# Patient Record
Sex: FEMALE | Race: NATIVE HAWAIIAN OR OTHER PACIFIC ISLANDER | NOT HISPANIC OR LATINO
[De-identification: names, ages, dates, MRNs, and addresses within clinical notes are randomized per-mention and may not be internally consistent; named-entity substitution may affect disease eponyms.]

---

## 2019-09-09 ENCOUNTER — APPOINTMENT (OUTPATIENT)
Dept: GASTROENTEROLOGY | Facility: CLINIC | Age: 84
End: 2019-09-09
Payer: MEDICARE

## 2019-09-09 VITALS
HEIGHT: 58 IN | SYSTOLIC BLOOD PRESSURE: 126 MMHG | BODY MASS INDEX: 17.42 KG/M2 | WEIGHT: 83 LBS | HEART RATE: 59 BPM | TEMPERATURE: 97.4 F | DIASTOLIC BLOOD PRESSURE: 70 MMHG | OXYGEN SATURATION: 99 %

## 2019-09-09 DIAGNOSIS — Z00.00 ENCOUNTER FOR GENERAL ADULT MEDICAL EXAMINATION W/OUT ABNORMAL FINDINGS: ICD-10-CM

## 2019-09-09 DIAGNOSIS — Z78.9 OTHER SPECIFIED HEALTH STATUS: ICD-10-CM

## 2019-09-09 DIAGNOSIS — K57.90 DIVERTICULOSIS OF INTESTINE, PART UNSPECIFIED, W/OUT PERFORATION OR ABSCESS W/OUT BLEEDING: ICD-10-CM

## 2019-09-09 DIAGNOSIS — Z86.79 PERSONAL HISTORY OF OTHER DISEASES OF THE CIRCULATORY SYSTEM: ICD-10-CM

## 2019-09-09 DIAGNOSIS — Z86.39 PERSONAL HISTORY OF OTHER ENDOCRINE, NUTRITIONAL AND METABOLIC DISEASE: ICD-10-CM

## 2019-09-09 PROCEDURE — 99204 OFFICE O/P NEW MOD 45 MIN: CPT

## 2019-09-09 RX ORDER — CHLORTHALIDONE 25 MG/1
25 TABLET ORAL
Refills: 0 | Status: ACTIVE | COMMUNITY

## 2019-09-09 RX ORDER — ATORVASTATIN CALCIUM 10 MG/1
10 TABLET, FILM COATED ORAL
Refills: 0 | Status: ACTIVE | COMMUNITY

## 2019-09-09 NOTE — HISTORY OF PRESENT ILLNESS
[Hospitalization] : was hospitalized [Heartburn] : denies heartburn [Nausea] : denies nausea [Vomiting] : denies vomiting [Diarrhea] : denies diarrhea [Yellow Skin Or Eyes (Jaundice)] : denies jaundice [Rectal Pain] : denies rectal pain [Wt Loss ___ Lbs] : recent [unfilled] ~Upound(s) weight loss [Abdominal Pain] : abdominal pain [Constipation] : constipation [Abdominal Swelling] : abdominal swelling [Wt Gain ___ Lbs] : no recent weight gain [GERD] : no gastroesophageal reflux disease [Hiatus Hernia] : no hiatus hernia [Peptic Ulcer Disease] : no peptic ulcer disease [Pancreatitis] : no pancreatitis [Cholelithiasis] : no cholelithiasis [Kidney Stone] : no kidney stone [Inflammatory Bowel Disease] : no inflammatory bowel disease [Irritable Bowel Syndrome] : no irritable bowel syndrome [Diverticulitis] : no diverticulitis [Alcohol Abuse] : no alcohol abuse [Malignancy] : no malignancy [Abdominal Surgery] : no abdominal surgery [Appendectomy] : no appendectomy [Cholecystectomy] : no cholecystectomy [de-identified] : The blood work performed on August 9, 2019 was significant for a low potassium level of 3.3 mmol/L, a low chloride level of 83 mmol/L, and elevated total bilirubin of 1.5 mg/dL. There was no evidence of anemia with a hemoglobin/hematocrit level 12.4/35.8, respectively. repeat blood work performed on August 10, 2019 revealed anemia with a hemoglobin/hematocrit level of 11.5/33.3, respectively.  The chest x-ray performed on August 9, 2019 revealed cardio-mediastinal silhouette within normal limits. A tortuous aorta with atherosclerotic changes at the arch with no evidence of pulmonary consolidation, pleural effusion, or pneumothorax. The osseous structures appeared grossly unremarkable.  the CAT scan of the abdomen and pelvis with IV contrast performed on August 9, 2019 revealed colonic diverticulosis without evidence of diverticulitis and moderate distention of the bladder. [de-identified] : The patient is an 84-year-old [place] female with past medical history significant for hypertension and hypercholesterolemia who was referred to my office by Dr. Emory Varghese for recent history of lower GI bleeding.  The patient also admits to having  abdominal pain, dyspepsia, constipation, change in bowel habits and change in caliber of stool. I was asked to render an opinion for consultation for the above complaints.   The patient states that she is feeling better.  The patient was recently hospitalized at [facility] on 2019 for lower GI bleeding.  The patient remained stable.  The blood work performed on 2019 was significant for a low potassium level of 3.3 mmol/L, a low chloride level of 83 mmol/L, and elevated total bilirubin of 1.5 mg/dL. There was no evidence of anemia with a hemoglobin/hematocrit level 12.4/35.8, respectively. repeat blood work performed on August 10, 2019 revealed anemia with a hemoglobin/hematocrit level of 11.5/33.3, respectively.  The chest x-ray performed on 2019 revealed cardio-mediastinal silhouette within normal limits. A tortuous aorta with atherosclerotic changes at the arch with no evidence of pulmonary consolidation, pleural effusion, or pneumothorax. The osseous structures appeared grossly unremarkable.  the CAT scan of the abdomen and pelvis with IV contrast performed on 2019 revealed colonic diverticulosis without evidence of diverticulitis and moderate distention of the bladder.The patient refused colonoscopy and was discharged in stable condition.  The patient previously complained of abdominal pain.  The patient describes the abdominal pain as a crampy, intermittent lower abdominal discomfort that is nonradiating in nature.  The abdominal pain is worse with meals and improves with passing gas or having a bowel movement.  The abdominal pain is described as being moderate in nature.  The abdominal pain occurred during the day.  The abdominal pain can occur at any time.   The abdominal pain never has awakened the patient from sleep.  The abdominal pain is not relieved with any medications.  The abdominal pain is associated with abdominal gas and bloating.  The patient currently denies any abdominal pain.  The patient denies any nausea or vomiting.  The patient denies any gastroesophageal reflux disease or dysphagia. The patient denies any atypical chest pain, shortness of breath or palpitations.  The patient denies any diaphoresis. The patient complains of constipation but denies any diarrhea.  The patient has 1 bowel movement every other day.  The patient complains of a change in bowel habits.  The patient complains of a change in caliber of stool.   The patient denies having mucus discharge with the bowel movements.  The patient previously complained of lower GI bleeding that resolved spontaneously.  She currently denies any bright red blood per rectum, melena or hematemesis.  The lower GI bleeding was associated with constipation and internal hemorrhoids.  The patient denies any rectal pain or rectal pruritus. The patient complains of weight loss but denies any anorexia.  The patient admits to losing 5 pounds over the past 4 weeks.  She denies any fevers or chills.  The patient denies any jaundice or pruritus.  The patient denies any back pain.  The patient admits to having a prior upper endoscopy and colonoscopy performed by another gastroenterologist, Dr. Jose.  According to the patient’s daughter, the upper endoscopic findings revealed a gastric polyp.  The colonoscopic findings performed performed by Dr. RAHEEM Jose on 2015 revealed colonic polyps. diverticulosis and internal hemorrhoids.  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 The patient's last menstrual period was age 55.  The patient is a .  The patient's first menstrual period was at age 15. The patient denies any significant family history of GI problems.

## 2019-09-09 NOTE — ASSESSMENT
[FreeTextEntry1] : Abdominal Pain: The patient complains of abdominal pain. The patient is to avoid nonsteroidal anti-inflammatory drugs and aspirin.  I recommend a trial of Phazyme 1 tablet PO 3 times a day for 3 months for the symptoms.\par Dyspepsia: The patient complains of dyspeptic symptoms.  The patient was advised to abide by an anti-gas diet.  The patient was given a pamphlet for anti-gas.  The patient and I reviewed the anti-gas diet at length. The patient is to start on a trial of Phazyme one tablet 3 times a day p.r.n. abdominal pain and gas.\par Constipation: The patient complains of constipation. I recommend a high-fiber diet. I recommend a trial of a probiotic such as Align once a day. I recommend a trial of Metamucil once a day for fiber supplementation. I recommend a  If the symptoms persist, the patient may require a trial of Linzess 145 mcg once a day for the constipation. The patient agreed and will followup to reassess the symptoms.  \par Rectal Bleeding: The patient had episodes of rectal bleeding.  The etiology of the rectal bleeding is unclear.  I recommend a trial of Anusol HC suppositories one per rectum QHS and Anusol HC 2.5% cream apply to affected area twice a day PRN hemorrhoidal bleeding or pain.  I recommend a trial of Calmoseptine cream apply to affected area twice a day for rectal discomfort. I recommend Tucks pads for the hemorrhoids.  I recommend starting Sitz baths twice a day for the hemorrhoids.  I recommend avoid wearing tight underwear and use boxers. I recommend avoid touching the perineal area.  I recommend a colonoscopy to assess the site of bleeding. The patient and family were told of the risks and benefits of the procedure.  The patient and family were told of the risks of perforation, emergency surgery, bleeding, infections and missed lesions.  The patient is hesitant about undergoing the procedure and will not schedule for the procedure at this time.  The patient is to return to  the office after discussing the decision for a colonoscopy.\par Prior Endoscopic Procedures: The patient had a prior upper endoscopy and colonoscopy performed by another gastroenterologist.  I will try to obtain the prior upper endoscopy and colonoscopy reports.  The patient is to sign a record release to obtain those records. \par History of Colonic Polyps: The patient has a history of colonic polyps.  I recommend a repeat colonoscopy to reassess for colonic polyps.  The patient is hesitant about undergoingnthe procedure. \par Diverticulosis: I recommend a high-fiber diet and avoid seeds. The patient is to consider a trial of Metamucil once a day for fiber supplementation. The patient is to also consider a trial of a probiotic such as Align once a day. \par Internal Hemorrhoids: The patient is to be started on a trial of Anusol H. C. suppositories one per rectum nightly and Anusol HC2 .5% cream apply to affected area twice a day p.r.n. hemorrhoidal bleeding or pain. \par The patient was recently hospitalized at [facility] for GI bleeding.  I reviewed the records.\par Diverticulosis: I recommend a high-fiber diet and avoid seeds. The patient is to consider a trial of Metamucil once a day for fiber supplementation. The patient is to also consider a trial of a probiotic such as Align once a day. \par Colonoscopy: I recommend a colonoscopy to assess the site of bleeding. The patient and family were told of the risks and benefits of the procedure.  The patient and family were told of the risks of perforation, emergency surgery, bleeding, infections and missed lesions.  The patient is hesitant about undergoing the procedure and will not schedule for the procedure at this time.  The patient is to return to  the office after discussing the decision for a colonoscopy.\par Follow-up: The patient is to follow-up in the office in 4 weeks to reassess the symptoms. The patient was told to call the office if any further problems. \par \par \par \par

## 2019-09-09 NOTE — REVIEW OF SYSTEMS
[Feeling Tired] : feeling tired [Sore Throat] : sore throat [Eyesight Problems] : eyesight problems [Constipation] : constipation [Abdominal Pain] : abdominal pain [Depression] : depression [Anxiety] : anxiety [Negative] : Heme/Lymph

## 2019-09-10 LAB — HEMOCCULT STL QL: NEGATIVE

## 2019-09-18 ENCOUNTER — APPOINTMENT (OUTPATIENT)
Dept: GASTROENTEROLOGY | Facility: CLINIC | Age: 84
End: 2019-09-18

## 2019-10-18 ENCOUNTER — APPOINTMENT (OUTPATIENT)
Dept: GASTROENTEROLOGY | Facility: CLINIC | Age: 84
End: 2019-10-18
Payer: MEDICARE

## 2019-10-18 VITALS — HEIGHT: 59 IN | BODY MASS INDEX: 16.73 KG/M2 | HEART RATE: 75 BPM | OXYGEN SATURATION: 92 % | WEIGHT: 83 LBS

## 2019-10-18 VITALS
OXYGEN SATURATION: 90 % | HEART RATE: 75 BPM | DIASTOLIC BLOOD PRESSURE: 65 MMHG | SYSTOLIC BLOOD PRESSURE: 102 MMHG | TEMPERATURE: 98.1 F

## 2019-10-18 DIAGNOSIS — R10.13 EPIGASTRIC PAIN: ICD-10-CM

## 2019-10-18 PROCEDURE — 99213 OFFICE O/P EST LOW 20 MIN: CPT

## 2019-10-18 NOTE — ASSESSMENT
[FreeTextEntry1] : Abdominal Pain: The patient complains of abdominal pain. The patient is to avoid nonsteroidal anti-inflammatory drugs and aspirin.  I recommend a trial of Pepcid 20 mg twice a day for 3 months for the symptoms.\par Dyspepsia: The patient complains of dyspeptic symptoms.  The patient was advised to abide by an anti-gas diet.  The patient was given a pamphlet for anti-gas.  The patient and I reviewed the anti-gas diet at length. The patient is to start on a trial of Phazyme one tablet 3 times a day p.r.n. abdominal pain and gas.\par Constipation: The patient complains of constipation. I recommend a high-fiber diet. I recommend a trial of a probiotic such as Align once a day. I recommend a trial of Metamucil once a day for fiber supplementation. I recommend a trial of Miralax once a day for the constipation. If the symptoms persist, the patient may require a colonoscopy to assess the symptoms.  The patient was told of the risks and benefits of the procedure.  The patient was told of the risks of perforation, emergency surgery, bleeding, infections and missed lesions.  The patient agreed and will followup to reassess the symptoms.  \par Prior Endoscopic Procedures: The patient had a prior upper endoscopy and colonoscopy performed by another gastroenterologist. I will try to obtain the prior upper endoscopy and colonoscopy reports. The patient is to sign a record release to obtain those records. \par History of Colonic Polyps: The patient has a history of colonic polyps. I recommend a repeat colonoscopy to reassess for colonic polyps. The patient is hesitant about undergoingnthe procedure. \par Diverticulosis: I recommend a high-fiber diet and avoid seeds. The patient is to consider a trial of Metamucil once a day for fiber supplementation. The patient is to also consider a trial of a probiotic such as Align once a day. \par Internal Hemorrhoids: The patient is to be started on a trial of Anusol H. C. suppositories one per rectum nightly and Anusol HC2.5% cream apply to affected area twice a day p.r.n. hemorrhoidal bleeding or pain. \par The patient was recently hospitalized at [facility] for GI bleeding. I reviewed the records.\par Diverticulosis: I recommend a high-fiber diet and avoid seeds. The patient is to consider a trial of Metamucil once a day for fiber supplementation. The patient is to also consider a trial of a probiotic such as Align once a day. \par Colonoscopy: I recommend a colonoscopy to assess the site of bleeding. The patient and family were told of the risks and benefits of the procedure. The patient and family were told of the risks of perforation, emergency surgery, bleeding, infections and missed lesions. The patient is hesitant about undergoing the procedure and will not schedule for the procedure at this time. The patient is to return to the office after discussing the decision for a colonoscopy.\par Anemia; The patient was found to have anemia on recent blood work.  The patient denies any bright red blood per rectum, melena or hematemesis.  The patient was guaiac-negative on physical exam.  I recommend an upper endoscopy and colonoscopy to assess the anemia.  The patient and family were told of the risks and benefits of the procedure.  The patient and family were told of the risks of perforation, emergency surgery, bleeding, infections and missed lesions.  The patient and family refuses to schedule for the procedures at this time secondary to the patient's frail state and advanced age.  The patient and family are aware of the risks of further bleeding, peptic ulcer disease, colitis, and even cancer as a cause of the anemia but still refuse to undergo the procedures.  \par Follow-up: The patient is to follow-up in the office in 3 months to reassess the symptoms. The patient was told to call the office if any further problems. \par \par \par \par

## 2019-10-18 NOTE — HISTORY OF PRESENT ILLNESS
[None] : had no significant interval events [Heartburn] : denies heartburn [Nausea] : denies nausea [Vomiting] : denies vomiting [Rectal Pain] : denies rectal pain [Diarrhea] : denies diarrhea [Yellow Skin Or Eyes (Jaundice)] : denies jaundice [Constipation] : constipation [Abdominal Pain] : abdominal pain [Abdominal Swelling] : abdominal swelling [Wt Gain ___ Lbs] : no recent weight gain [Wt Loss ___ Lbs] : no recent weight loss [GERD] : no gastroesophageal reflux disease [Hiatus Hernia] : no hiatus hernia [Peptic Ulcer Disease] : no peptic ulcer disease [Pancreatitis] : no pancreatitis [Cholelithiasis] : no cholelithiasis [Kidney Stone] : no kidney stone [Inflammatory Bowel Disease] : no inflammatory bowel disease [Irritable Bowel Syndrome] : no irritable bowel syndrome [Diverticulitis] : no diverticulitis [Alcohol Abuse] : no alcohol abuse [Malignancy] : no malignancy [Abdominal Surgery] : no abdominal surgery [Appendectomy] : no appendectomy [Cholecystectomy] : no cholecystectomy [de-identified] : The patient states that she is feeling better. The patient denies any jaundice or pruritus.  The patient complains of occasional lower back pain. The patient complains of abdominal pain.  The patient describes the abdominal pain as a burning, intermittent midepigastric discomfort that is nonradiating in nature.  The abdominal pain is unrelated to meals.  The abdominal pain improves with passing gas or having a bowel movement.  The abdominal pain is described as being mild in nature.  The abdominal pain occurs at night and in the morning.  The abdominal pain can occur at any time.   The abdominal pain has never awakened the patient from sleep.  The abdominal pain is relieved with certain medication such as proton pump inhibitors, H2 blockers and antacids.  The abdominal pain is associated with abdominal gas and bloating.  The patient denies any nausea or vomiting.  The patient denies any gastroesophageal reflux disease or dysphagia.  The patient denies any atypical chest pain, shortness of breath or palpitations.  The patient denies any diaphoresis. The patient complains of constipation but denies any diarrhea.  The patient has 1 bowel movement every other day.  The patient complains of a change in bowel habits.  The patient complains of a change in caliber of stool.   The patient denies having mucus discharge with the bowel movements.  The patient denies any bright red blood per rectum, melena or hematemesis.  The patient complains of rectal pain and rectal pruritus.  The patient previously complained of weight loss and anorexia.  She currently denies any weight loss or anorexia.  She denies any fevers or chills.  The patient was recently hospitalized at [facility] on August 9, 2019 for lower GI bleeding. The patient remained stable. The blood work performed on August 9, 2019 was significant for a low potassium level of 3.3 mmol/L, a low chloride level of 83 mmol/L, and elevated total bilirubin of 1.5 mg/dL. There was no evidence of anemia with a hemoglobin/hematocrit level 12.4/35.8, respectively, repeat blood work performed on August 10, 2019 revealed anemia with a hemoglobin/hematocrit level of 11.5/33.3, respectively. The chest x-ray performed on August 9, 2019 revealed cardio-mediastinal silhouette within normal limits. A tortuous aorta with atherosclerotic changes at the arch with no evidence of pulmonary consolidation, pleural effusion, or pneumothorax. The osseous structures appeared grossly unremarkable. The CAT scan of the abdomen and pelvis with IV contrast performed on August 9, 2019 revealed colonic diverticulosis without evidence of diverticulitis and moderate distention of the bladder. The patient refused colonoscopy and was discharged in stable condition.  The patient admits to having a prior upper endoscopy and colonoscopy performed by another gastroenterologist, Dr. Jose. According to the patient’s daughter, the upper endoscopic findings revealed a gastric polyp. The colonoscopic findings performed performed by Dr. RAHEEM Jose on April 20, 2015 revealed colonic polyps. diverticulosis and internal hemorrhoids. 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 The patient denies any significant family history of GI problems.

## 2019-11-13 ENCOUNTER — RX RENEWAL (OUTPATIENT)
Age: 84
End: 2019-11-13

## 2020-02-24 ENCOUNTER — APPOINTMENT (OUTPATIENT)
Dept: GASTROENTEROLOGY | Facility: CLINIC | Age: 85
End: 2020-02-24
Payer: MEDICARE

## 2020-02-24 ENCOUNTER — LABORATORY RESULT (OUTPATIENT)
Age: 85
End: 2020-02-24

## 2020-02-24 VITALS
DIASTOLIC BLOOD PRESSURE: 64 MMHG | BODY MASS INDEX: 16.53 KG/M2 | HEART RATE: 53 BPM | SYSTOLIC BLOOD PRESSURE: 137 MMHG | OXYGEN SATURATION: 98 % | HEIGHT: 59 IN | TEMPERATURE: 97.7 F | WEIGHT: 82 LBS

## 2020-02-24 PROCEDURE — 99213 OFFICE O/P EST LOW 20 MIN: CPT

## 2020-02-24 NOTE — ASSESSMENT
[FreeTextEntry1] : Dyspepsia: The patient complains of dyspeptic symptoms.  The patient was advised to abide by an anti-gas diet.  The patient was given a pamphlet for anti-gas.  The patient and I reviewed the anti-gas diet at length. The patient is to start on a trial of Phazyme one tablet 3 times a day p.r.n. abdominal pain and gas.\par Constipation: The patient complains of constipation. I recommend a high-fiber diet. I recommend a trial of a probiotic such as Align once a day. I recommend a trial of Metamucil once a day for fiber supplementation.  If the symptoms persist, the patient may require a trial of Linzess 72 mcg once a day for the constipation.  The patient agreed and will followup to reassess the symptoms.  \par Prior Endoscopic Procedures: The patient had a prior upper endoscopy and colonoscopy performed by another gastroenterologist. I will try to obtain the prior upper endoscopy and colonoscopy reports. The patient is to sign a record release to obtain those records. \par History of Colonic Polyps: The patient has a history of colonic polyps. I recommend a repeat colonoscopy to reassess for colonic polyps. The patient is hesitant about undergoing the procedure. \par Diverticulosis: I recommend a high-fiber diet and avoid seeds. The patient is to consider a trial of Metamucil once a day for fiber supplementation. The patient is to also consider a trial of a probiotic such as Align once a day. \par Internal Hemorrhoids: The patient is to consider a trial of Anusol H. C. suppositories one per rectum nightly and Anusol HC2.5% cream apply to affected area twice a day p.r.n. hemorrhoidal bleeding or pain. \par The patient was recently hospitalized at [facility] for GI bleeding. I reviewed the records.\par Diverticulosis: I recommend a high-fiber diet and avoid seeds. The patient is to consider a trial of Metamucil once a day for fiber supplementation. The patient is to also consider a trial of a probiotic such as Align once a day. \par Colonoscopy: I recommended a colonoscopy to assess the site of bleeding. The patient and family were again told of the risks and benefits of the procedure. The patient and family were again told of the risks of perforation, emergency surgery, bleeding, infections and missed lesions. The patient iand family are still  hesitant about undergoing the procedure and will not schedule for the procedure . The patient is to return to the office after discussing the decision for a colonoscopy.\par Anemia: The patient was previously found to have anemia on prior blood work. The patient denies any bright red blood per rectum, melena or hematemesis. The patient was previously guaiac-negative on physical exam. I recommended an upper endoscopy and colonoscopy to assess the anemia. The patient and family were again told of the risks and benefits of the procedure. The patient and family were again told of the risks of perforation, emergency surgery, bleeding, infections and missed lesions. The patient and family are still refusing to schedule for the procedures at this time secondary to the patient's frail state and advanced age. The patient and family are again aware of the risks of further bleeding, peptic ulcer disease, colitis, and even cancer as a cause of the anemia but still refuse to undergo the procedures. \par Follow-up: The patient is to follow-up in the office in 6 months to reassess the symptoms. The patient was told to call the office if any further problems. \par \par

## 2020-02-24 NOTE — HISTORY OF PRESENT ILLNESS
[None] : had no significant interval events [Heartburn] : denies heartburn [Nausea] : denies nausea [Diarrhea] : denies diarrhea [Vomiting] : denies vomiting [Yellow Skin Or Eyes (Jaundice)] : denies jaundice [Abdominal Pain] : denies abdominal pain [Rectal Pain] : denies rectal pain [Constipation] : constipation [Abdominal Swelling] : abdominal swelling [Wt Gain ___ Lbs] : no recent weight gain [Wt Loss ___ Lbs] : no recent weight loss [GERD] : no gastroesophageal reflux disease [Hiatus Hernia] : no hiatus hernia [Peptic Ulcer Disease] : no peptic ulcer disease [Pancreatitis] : no pancreatitis [Cholelithiasis] : no cholelithiasis [Inflammatory Bowel Disease] : no inflammatory bowel disease [Kidney Stone] : no kidney stone [Alcohol Abuse] : no alcohol abuse [Irritable Bowel Syndrome] : no irritable bowel syndrome [Diverticulitis] : no diverticulitis [Abdominal Surgery] : no abdominal surgery [Malignancy] : no malignancy [Cholecystectomy] : no cholecystectomy [Appendectomy] : no appendectomy [de-identified] : The patient states that she is feeling better. The patient denies any jaundice or pruritus.  The patient denies any chronic lower back pain. The patient denies any abdominal pain.  The patient complains of abdominal gas and bloating.  The patient denies any nausea or vomiting.  The patient denies any gastroesophageal reflux disease or dysphagia.  The patient complains of occasional dyspnea upon exertion but denies any atypical chest pain, shortness of breath or palpitations.  The patient denies any diaphoresis. The patient complains of occasional constipation but denies any diarrhea.  The patient has 1 bowel movement every 1 to 2 days.  She occasionally takes a laxative if constipated.  The patient complains of a change in bowel habits.  The patient complains of a change in caliber of stool.   The patient denies having mucus discharge with the bowel movements.  The patient denies any bright red blood per rectum, melena or hematemesis.  The patient denies any rectal pain or rectal pruritus.  The patient denies any weight loss or anorexia.  She denies any fevers or chills.   The patient was recently hospitalized at [facility] on August 9, 2019 for lower GI bleeding. The patient remained stable. The blood work performed on August 9, 2019 was significant for a low potassium level of 3.3 mmol/L, a low chloride level of 83 mmol/L, and elevated total bilirubin of 1.5 mg/dL. There was no evidence of anemia with a hemoglobin/hematocrit level 12.4/35.8, respectively, repeat blood work performed on August 10, 2019 revealed anemia with a hemoglobin/hematocrit level of 11.5/33.3, respectively. The chest x-ray performed on August 9, 2019 revealed cardio-mediastinal silhouette within normal limits. A tortuous aorta with atherosclerotic changes at the arch with no evidence of pulmonary consolidation, pleural effusion, or pneumothorax. The osseous structures appeared grossly unremarkable. The CAT scan of the abdomen and pelvis with IV contrast performed on August 9, 2019 revealed colonic diverticulosis without evidence of diverticulitis and moderate distention of the bladder. The patient refused colonoscopy and was discharged in stable condition.  The patient is still refusing colonoscopy to assess the rectal bleeding and history of anemia.  The patient admits to having a prior upper endoscopy and colonoscopy performed by another gastroenterologist, Dr. Jose. According to the patient’s daughter, the upper endoscopic findings revealed a gastric polyp. The colonoscopic findings performed performed by Dr. RAHEEM Jose on April 20, 2015 revealed colonic polyps. diverticulosis and internal hemorrhoids. 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 The patient denies any significant family history of GI problems. \par , Daughter Lilliam.\par

## 2020-08-24 ENCOUNTER — APPOINTMENT (OUTPATIENT)
Dept: GASTROENTEROLOGY | Facility: CLINIC | Age: 85
End: 2020-08-24
Payer: MEDICARE

## 2020-08-24 ENCOUNTER — TRANSCRIPTION ENCOUNTER (OUTPATIENT)
Age: 85
End: 2020-08-24

## 2020-08-24 VITALS
WEIGHT: 80 LBS | BODY MASS INDEX: 16.13 KG/M2 | HEIGHT: 59 IN | SYSTOLIC BLOOD PRESSURE: 134 MMHG | OXYGEN SATURATION: 99 % | DIASTOLIC BLOOD PRESSURE: 66 MMHG | HEART RATE: 60 BPM | TEMPERATURE: 98.1 F

## 2020-08-24 PROCEDURE — 99213 OFFICE O/P EST LOW 20 MIN: CPT

## 2020-08-24 RX ORDER — FAMOTIDINE 20 MG/1
20 TABLET, FILM COATED ORAL
Qty: 60 | Refills: 3 | Status: ACTIVE | COMMUNITY
Start: 2020-08-24 | End: 1900-01-01

## 2020-08-24 NOTE — ASSESSMENT
[FreeTextEntry1] : Dyspepsia: The patient complains of dyspeptic symptoms.  The patient was advised to abide by an anti-gas (low FOD-MAP) diet.  The patient was given a pamphlet for anti-gas.  The patient and I reviewed the anti-gas diet at length. The patient is to continue on a trial of Phazyme one tablet 3 times a day p.r.n. abdominal pain and gas. I also recommend continue on Pepcid 20 mg twice a day for the symptoms.  \par Constipation: The patient complains of constipation. I recommend a high-fiber diet. I recommend a trial of a probiotic such as Align once a day. I recommend a trial of Metamucil once a day for fiber supplementation. If the symptoms persist, the patient may require a trial of Linzess 145 mcg once a day for the constipation.   The patient agreed and will followup to reassess the symptoms.  \par Prior Endoscopic Procedures: The patient had a prior upper endoscopy and colonoscopy performed by another gastroenterologist. I will try to obtain the prior upper endoscopy and colonoscopy reports. The patient is to sign a record release to obtain those records. \par History of Colonic Polyps: The patient has a history of colonic polyps. I recommend a repeat colonoscopy to reassess for colonic polyps. The patient is hesitant about undergoing the procedure. \par Diverticulosis: I recommend a high-fiber diet and avoid seeds. The patient is to consider a trial of Metamucil once a day for fiber supplementation. The patient is to also consider a trial of a probiotic such as Align once a day. \par Internal Hemorrhoids: The patient is to consider a trial of Anusol H. C. suppositories one per rectum nightly and Anusol HC2.5% cream apply to affected area twice a day p.r.n. hemorrhoidal bleeding or pain. \par The patient was recently hospitalized at [facility] for GI bleeding. I reviewed the records.\par Diverticulosis: I recommend a high-fiber diet and avoid seeds. The patient is to continue on a trial of Metamucil once a day for fiber supplementation. The patient is to also continue on a trial of a probiotic such as Align once a day. \par Colonoscopy: I recommended a colonoscopy to assess the site of bleeding. The patient and family were again told of the risks and benefits of the procedure. The patient and family were again told of the risks of perforation, emergency surgery, bleeding, infections and missed lesions. The patient and family are still hesitant about undergoing the procedure and will not schedule for the procedure. The patient is to return to the office after discussing the decision for a colonoscopy.\par Anemia: The patient was previously found to have anemia on prior blood work. The patient denies any bright red blood per rectum, melena or hematemesis. The patient was previously guaiac-negative on physical exam.  The recent blood work did not reveal anemia.  I recommended an upper endoscopy and colonoscopy to assess the anemia. The patient and family were again told of the risks and benefits of the procedure. The patient and family were again told of the risks of perforation, emergency surgery, bleeding, infections and missed lesions. The patient and family are still refusing to schedule for the procedures at this time secondary to the patient's frail state and advanced age. The patient and family are again aware of the risks of further bleeding, peptic ulcer disease, colitis, and even cancer as a cause of the anemia but still refuse to undergo the procedures. \par Follow-up: The patient is to follow-up in the office in 3 months to reassess the symptoms. The patient was told to call the office if any further problems. \par \par \par

## 2020-08-24 NOTE — HISTORY OF PRESENT ILLNESS
[Vomiting] : denies vomiting [None] : had no significant interval events [Diarrhea] : denies diarrhea [Yellow Skin Or Eyes (Jaundice)] : denies jaundice [Abdominal Pain] : denies abdominal pain [Rectal Pain] : denies rectal pain [Wt Loss ___ Lbs] : recent [unfilled] ~Upound(s) weight loss [Heartburn] : heartburn [Nausea] : nausea [Constipation] : constipation [Abdominal Swelling] : abdominal swelling [GERD] : gastroesophageal reflux disease [Wt Gain ___ Lbs] : no recent weight gain [Pancreatitis] : no pancreatitis [Hiatus Hernia] : no hiatus hernia [Peptic Ulcer Disease] : no peptic ulcer disease [Cholelithiasis] : no cholelithiasis [Kidney Stone] : no kidney stone [Inflammatory Bowel Disease] : no inflammatory bowel disease [Irritable Bowel Syndrome] : no irritable bowel syndrome [Diverticulitis] : no diverticulitis [Alcohol Abuse] : no alcohol abuse [Abdominal Surgery] : no abdominal surgery [Malignancy] : no malignancy [Appendectomy] : no appendectomy [Cholecystectomy] : no cholecystectomy [de-identified] : (-) smoking, (-) ETOH, (-) IVDA\par  [de-identified] : The patient states that she is feeling weak and tired. The patient denies any jaundice or pruritus.  The patient complains of occasional lower back pain. The patient denies any abdominal pain.  The patient complains of abdominal gas and bloating.  The patient denies any nausea or vomiting.  The patient denies any gastroesophageal reflux disease or dysphagia.  The patient complains of dyspnea upon exertion but denies any atypical chest pain, shortness of breath or palpitations.  The patient admits to occasional episodes of diaphoresis.  The patient complains of constipation but denies any diarrhea.  The patient has 1 bowel movement every 1 to 2 days.  The patient complains of a change in bowel habits.  The patient complains of a change in caliber of stool.   The patient admit to having mucus discharge with the bowel movements.  The patient denies any bright red blood per rectum, melena or hematemesis. The patient denies any rectal pain or rectal pruritus.  The patient complains of weight loss and occasional anorexia.  The patient admits to losing 2 pounds over the past 5 months.  She denies any fevers or chills.   The blood work performed by her PMD on June 16, 2020 revealed an elevated total cholesterol of 200 mg/dL, a low sodium/chloride level of 129/91 mmol/L, respectively, and elevated ferritin level of 7 64 ng/mL. The COVID 19 IgG antibody was negative. The patient was recently hospitalized at [facility] on August 9, 2019 for lower GI bleeding. The patient remained stable. The blood work performed on August 9, 2019 was significant for a low potassium level of 3.3 mmol/L, a low chloride level of 83 mmol/L, and elevated total bilirubin of 1.5 mg/dL. There was no evidence of anemia with a hemoglobin/hematocrit level 12.4/35.8, respectively, repeat blood work performed on August 10, 2019 revealed anemia with a hemoglobin/hematocrit level of 11.5/33.3, respectively. The chest x-ray performed on August 9, 2019 revealed cardio-mediastinal silhouette within normal limits. A tortuous aorta with atherosclerotic changes at the arch with no evidence of pulmonary consolidation, pleural effusion, or pneumothorax. The osseous structures appeared grossly unremarkable. The CAT scan of the abdomen and pelvis with IV contrast performed on August 9, 2019 revealed colonic diverticulosis without evidence of diverticulitis and moderate distention of the bladder. The patient refused colonoscopy and was discharged in stable condition. The patient is still refusing colonoscopy to assess the rectal bleeding and history of anemia. The patient admits to having a prior upper endoscopy and colonoscopy performed by another gastroenterologist, Dr. Jose. According to the patient’s daughter, the upper endoscopic findings revealed a gastric polyp. The colonoscopic findings performed performed by Dr. RAHEEM Jose on April 20, 2015 revealed colonic polyps. diverticulosis and internal hemorrhoids. 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  The blood work performed by her PMD on June 16, 2020 revealed an elevated total cholesterol of 200 mg/dL, a low sodium/chloride level of 129/91 mmol/L, respectively, and elevated ferritin level of 7 64 ng/mL. The COVID 19 IgG antibody was negative. The patient denies any significant family history of GI problems. \par \par 2 daughters are  for the patient. Stacy Escobar

## 2020-08-25 LAB — HEMOCCULT STL QL: NEGATIVE

## 2021-05-03 ENCOUNTER — APPOINTMENT (OUTPATIENT)
Dept: GASTROENTEROLOGY | Facility: CLINIC | Age: 86
End: 2021-05-03
Payer: MEDICARE

## 2021-05-03 VITALS
DIASTOLIC BLOOD PRESSURE: 73 MMHG | SYSTOLIC BLOOD PRESSURE: 152 MMHG | HEIGHT: 59 IN | BODY MASS INDEX: 16.13 KG/M2 | HEART RATE: 62 BPM | OXYGEN SATURATION: 100 % | TEMPERATURE: 97.2 F | WEIGHT: 80 LBS

## 2021-05-03 DIAGNOSIS — K21.9 GASTRO-ESOPHAGEAL REFLUX DISEASE W/OUT ESOPHAGITIS: ICD-10-CM

## 2021-05-03 PROCEDURE — 99214 OFFICE O/P EST MOD 30 MIN: CPT

## 2021-05-03 PROCEDURE — 99072 ADDL SUPL MATRL&STAF TM PHE: CPT

## 2021-05-03 RX ORDER — TRIAMCINOLONE ACETONIDE 1 MG/G
0.1 OINTMENT TOPICAL
Qty: 80 | Refills: 0 | Status: ACTIVE | COMMUNITY
Start: 2020-12-21

## 2021-05-03 RX ORDER — ALPRAZOLAM 0.25 MG/1
0.25 TABLET ORAL
Qty: 60 | Refills: 0 | Status: ACTIVE | COMMUNITY
Start: 2021-03-30

## 2021-05-03 RX ORDER — ATENOLOL 25 MG/1
25 TABLET ORAL
Qty: 90 | Refills: 0 | Status: ACTIVE | COMMUNITY
Start: 2020-11-29

## 2021-05-03 RX ORDER — OMEPRAZOLE 20 MG/1
20 CAPSULE, DELAYED RELEASE ORAL DAILY
Qty: 30 | Refills: 3 | Status: ACTIVE | COMMUNITY
Start: 2021-05-03 | End: 1900-01-01

## 2021-05-03 RX ORDER — ALBUTEROL SULFATE 90 UG/1
108 (90 BASE) INHALANT RESPIRATORY (INHALATION)
Qty: 7 | Refills: 0 | Status: ACTIVE | COMMUNITY
Start: 2021-03-30

## 2021-05-03 RX ORDER — SIMETHICONE 180 MG
180 CAPSULE ORAL 4 TIMES DAILY
Qty: 120 | Refills: 3 | Status: ACTIVE | COMMUNITY
Start: 2021-05-03 | End: 1900-01-01

## 2021-05-03 RX ORDER — NYSTATIN 100000 [USP'U]/G
100000 CREAM TOPICAL
Qty: 30 | Refills: 0 | Status: ACTIVE | COMMUNITY
Start: 2020-12-21

## 2021-05-03 NOTE — HISTORY OF PRESENT ILLNESS
[None] : had no significant interval events [Nausea] : denies nausea [Vomiting] : denies vomiting [Diarrhea] : denies diarrhea [Yellow Skin Or Eyes (Jaundice)] : denies jaundice [Abdominal Pain] : denies abdominal pain [Heartburn] : heartburn [Constipation] : constipation [Abdominal Swelling] : abdominal swelling [Rectal Pain] : rectal pain [GERD] : gastroesophageal reflux disease [Wt Gain ___ Lbs] : no recent weight gain [Wt Loss ___ Lbs] : no recent weight loss [Hiatus Hernia] : no hiatus hernia [Peptic Ulcer Disease] : no peptic ulcer disease [Pancreatitis] : no pancreatitis [Cholelithiasis] : no cholelithiasis [Kidney Stone] : no kidney stone [Inflammatory Bowel Disease] : no inflammatory bowel disease [Irritable Bowel Syndrome] : no irritable bowel syndrome [Diverticulitis] : no diverticulitis [Alcohol Abuse] : no alcohol abuse [Malignancy] : no malignancy [Abdominal Surgery] : no abdominal surgery [Appendectomy] : no appendectomy [Cholecystectomy] : no cholecystectomy [de-identified] : The patient states that she is feeling fine. The patient denies any jaundice or pruritus.  The patient denies any chronic lower back pain. The patient complains of occasional abdominal pain.  The patient describes the abdominal pain as a crampy, intermittent lower abdominal discomfort that is nonradiating in nature.  The abdominal pain is unrelated to meals.  The abdominal pain improves with passing gas or having a bowel movement.  The abdominal pain is described as being mild in nature.  The abdominal pain occurs the morning.  The abdominal pain can occur at any time.   The abdominal pain has never awakened the patient from sleep.  The abdominal pain is relieved with certain medication such as proton pump inhibitors, H2 blockers and antacids.  The abdominal pain is associated with abdominal gas and bloating.  The patient denies any nausea or vomiting.  The patient complains of occasional gastroesophageal reflux disease but denies any dysphagia.  The gastroesophageal reflux disease.  The gastroesophageal reflux disease is worse after meals and late at night and in the early morning. The gastroesophageal reflux disease is improved with proton pump inhibitors, H2 blockers and antacids.   The patient denies any atypical chest pain, shortness of breath or palpitations.  The patient denies any diaphoresis. The patient complains of occasional constipation but denies any diarrhea.  The patient has 1 bowel movement every other day.  The patient complains of a change in bowel habits.  The patient complains of a change in caliber of stool.   The patient denies having mucus discharge with the bowel movements.  The patient denies any bright red blood per rectum, melena or hematemesis.  The patient complains of occasional rectal pain but denies any rectal pruritus.  The patient denies any weight loss or anorexia.  She denies any fevers or chills.  The blood work performed by her PMD on June 16, 2020 revealed an elevated total cholesterol of 200 mg/dL, a low sodium/chloride level of 129/91 mmol/L, respectively, and elevated ferritin level of 7 64 ng/mL. The COVID 19 IgG antibody was negative. The patient was recently hospitalized at [facility] on August 9, 2019 for lower GI bleeding. The patient remained stable. The blood work performed on August 9, 2019 was significant for a low potassium level of 3.3 mmol/L, a low chloride level of 83 mmol/L, and elevated total bilirubin of 1.5 mg/dL. There was no evidence of anemia with a hemoglobin/hematocrit level 12.4/35.8, respectively, repeat blood work performed on August 10, 2019 revealed anemia with a hemoglobin/hematocrit level of 11.5/33.3, respectively. The chest x-ray performed on August 9, 2019 revealed cardio-mediastinal silhouette within normal limits. A tortuous aorta with atherosclerotic changes at the arch with no evidence of pulmonary consolidation, pleural effusion, or pneumothorax. The osseous structures appeared grossly unremarkable. The CAT scan of the abdomen and pelvis with IV contrast performed on August 9, 2019 revealed colonic diverticulosis without evidence of diverticulitis and moderate distention of the bladder. The patient refused colonoscopy and was discharged in stable condition. The patient is still refusing colonoscopy to assess the rectal bleeding and history of anemia. The patient admits to having a prior upper endoscopy and colonoscopy performed by another gastroenterologist, Dr. Jose. According to the patient’s daughter, the upper endoscopic findings revealed a gastric polyp. The colonoscopic findings performed performed by Dr. RAHEEM Jose on April 20, 2015 revealed colonic polyps. diverticulosis and internal hemorrhoids. 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 The blood work performed by her PMD on June 16, 2020 revealed an elevated total cholesterol of 200 mg/dL, a low sodium/chloride level of 129/91 mmol/L, respectively, and elevated ferritin level of 7 64 ng/mL. The COVID 19 IgG antibody was negative. The patient denies any significant family history of GI problems. \par \par  is patient's daughter and granddaughter [de-identified] : (-) smoking, (-) ETOH, (-) IVDA\par \par

## 2021-05-03 NOTE — ASSESSMENT
[FreeTextEntry1] : Dyspepsia: The patient complains of dyspeptic symptoms.  The patient was advised to abide by an anti-gas diet.  The patient was previously given a pamphlet for anti-gas. The patient is to start on a trial of Phazyme one tablet 3 times a day p.r.n. abdominal pain and gas.\par GERD: The patient was advised to avoid late-night meals and dietary indiscretions.  The patient was advised to avoid fried and fatty foods.  The patient was advised to abide by an anti-GERD diet. The patient was given a pamphlet for anti-GERD.  The patient and I reviewed the anti-GERD diet at length. I recommend continue on a trial of Pantoprazole 20 mg once a day x 3 months for the symptoms.\par Constipation: The patient complains of occasional constipation. I recommend continue on a high-fiber diet. I recommend a trial of a probiotic such as Align once a day. I recommend a trial of Metamucil once a day for fiber supplementation.   The patient agreed and will followup to reassess the symptoms.  \par Prior Endoscopic Procedures: The patient had a prior upper endoscopy and colonoscopy performed by another gastroenterologist. I will try to obtain the prior upper endoscopy and colonoscopy reports. The patient is to sign a record release to obtain those records. \par History of Colonic Polyps: The patient has a history of colonic polyps. I recommend a repeat colonoscopy to reassess for colonic polyps. The patient is hesitant about undergoing the procedure. \par Diverticulosis: I recommend a high-fiber diet and avoid seeds. The patient is to consider a trial of Metamucil once a day for fiber supplementation. The patient is to also consider a trial of a probiotic such as Align once a day. \par Internal Hemorrhoids: The patient is to consider a trial of Anusol H. C. suppositories one per rectum nightly and Anusol HC2.5% cream apply to affected area twice a day p.r.n. hemorrhoidal bleeding or pain. \par The patient was recently hospitalized at [facility] for GI bleeding. I reviewed the records.\par Diverticulosis: I recommend a high-fiber diet and avoid seeds. The patient is to continue on a trial of Metamucil once a day for fiber supplementation. The patient is to also continue on a trial of a probiotic such as Align once a day. \par Colonoscopy: I recommended a colonoscopy to assess the site of bleeding. The patient and family were again told of the risks and benefits of the procedure. The patient and family were again told of the risks of perforation, emergency surgery, bleeding, infections and missed lesions. The patient and family are still hesitant about undergoing the procedure and will not schedule for the procedure. The patient is to return to the office after discussing the decision for a colonoscopy.\par Anemia: The patient was previously found to have anemia on prior blood work. The patient denies any bright red blood per rectum, melena or hematemesis. The patient was previously guaiac-negative on physical exam. The recent blood work did not reveal anemia. I recommended an upper endoscopy and colonoscopy to assess the anemia. The patient and family were again told of the risks and benefits of the procedure. The patient and family were again told of the risks of perforation, emergency surgery, bleeding, infections and missed lesions. The patient and family are still refusing to schedule for the procedures at this time secondary to the patient's frail state and advanced age. The patient and family are again aware of the risks of further bleeding, peptic ulcer disease, colitis, and even cancer as a cause of the anemia but still refuse to undergo the procedures. \par Follow-up: The patient is to follow-up in the office in 6 months to reassess the symptoms. The patient was told to call the office if any further problems. \par \par \par \par \par

## 2021-05-04 LAB — HEMOCCULT STL QL: NEGATIVE

## 2022-04-04 ENCOUNTER — APPOINTMENT (OUTPATIENT)
Dept: GASTROENTEROLOGY | Facility: CLINIC | Age: 87
End: 2022-04-04
Payer: MEDICARE

## 2022-04-04 VITALS
WEIGHT: 78 LBS | DIASTOLIC BLOOD PRESSURE: 66 MMHG | SYSTOLIC BLOOD PRESSURE: 156 MMHG | TEMPERATURE: 96.4 F | OXYGEN SATURATION: 100 % | BODY MASS INDEX: 15.72 KG/M2 | HEIGHT: 59 IN | HEART RATE: 61 BPM

## 2022-04-04 DIAGNOSIS — K62.5 HEMORRHAGE OF ANUS AND RECTUM: ICD-10-CM

## 2022-04-04 PROCEDURE — 99214 OFFICE O/P EST MOD 30 MIN: CPT

## 2022-04-04 RX ORDER — BIFIDOBACTERIUM LONGUM 10MM CELL
4 CAPSULE ORAL
Qty: 30 | Refills: 3 | Status: ACTIVE | COMMUNITY
Start: 2022-04-04 | End: 1900-01-01

## 2022-04-04 RX ORDER — PROMETHAZINE HYDROCHLORIDE 6.25 MG/5ML
6.25 SOLUTION ORAL
Qty: 120 | Refills: 0 | Status: ACTIVE | COMMUNITY
Start: 2021-12-15

## 2022-04-04 RX ORDER — WHEAT DEXTRIN/B6/FA/B12 3-0.7-134
POWDER (GRAM) ORAL
Qty: 2 | Refills: 3 | Status: ACTIVE | COMMUNITY
Start: 2022-04-04 | End: 1900-01-01

## 2022-04-04 NOTE — HISTORY OF PRESENT ILLNESS
[None] : had no significant interval events [Heartburn] : denies heartburn [Nausea] : denies nausea [Vomiting] : denies vomiting [Diarrhea] : denies diarrhea [Yellow Skin Or Eyes (Jaundice)] : denies jaundice [Abdominal Pain] : denies abdominal pain [Rectal Pain] : denies rectal pain [Constipation] : constipation [Abdominal Swelling] : abdominal swelling [Wt Gain ___ Lbs] : no recent weight gain [Wt Loss ___ Lbs] : no recent weight loss [GERD] : no gastroesophageal reflux disease [Hiatus Hernia] : no hiatus hernia [Peptic Ulcer Disease] : no peptic ulcer disease [Pancreatitis] : no pancreatitis [Cholelithiasis] : no cholelithiasis [Kidney Stone] : no kidney stone [Inflammatory Bowel Disease] : no inflammatory bowel disease [Irritable Bowel Syndrome] : no irritable bowel syndrome [Diverticulitis] : no diverticulitis [Alcohol Abuse] : no alcohol abuse [Malignancy] : no malignancy [Abdominal Surgery] : no abdominal surgery [Appendectomy] : no appendectomy [Cholecystectomy] : no cholecystectomy [de-identified] : The patient states that she is feeling fine. The patient denies any jaundice or pruritus.  The patient complains of occasional lower back pain. The patient denies any abdominal pain.  The patient complains of abdominal gas and bloating.  The patient denies any nausea or vomiting.  The patient denies any gastroesophageal reflux disease or dysphagia.  The patient complains of occasional palpitations but denies any atypical chest pain or shortness of breath.  The patient denies any diaphoresis. The patient complains of constipation but denies any diarrhea.  The patient has 1 bowel movement every 1 to 2 days. The patient complains of a change in bowel habits.  The patient complains of a change in caliber of stool. The patient denies having mucus discharge with the bowel movements.  The patient complains of occasional rectal bleeding but denies any melena or hematemesis.  The rectal bleeding is associated with constipation and internal hemorrhoids.  The patient complains of rectal pruritus but denies any rectal pain.  The patient complains of fluctuating weight but denies any anorexia.  She denies any fevers or chills.  The blood work performed by her PMD on June 16, 2020 revealed an elevated total cholesterol of 200 mg/dL, a low sodium/chloride level of 129/91 mmol/L, respectively, and elevated ferritin level of 7 64 ng/mL. The COVID 19 IgG antibody was negative. The patient was recently hospitalized at [facility] on August 9, 2019 for lower GI bleeding. The patient remained stable. The blood work performed on August 9, 2019 was significant for a low potassium level of 3.3 mmol/L, a low chloride level of 83 mmol/L, and elevated total bilirubin of 1.5 mg/dL. There was no evidence of anemia with a hemoglobin/hematocrit level 12.4/35.8, respectively, repeat blood work performed on August 10, 2019 revealed anemia with a hemoglobin/hematocrit level of 11.5/33.3, respectively. The chest x-ray performed on August 9, 2019 revealed cardio-mediastinal silhouette within normal limits. A tortuous aorta with atherosclerotic changes at the arch with no evidence of pulmonary consolidation, pleural effusion, or pneumothorax. The osseous structures appeared grossly unremarkable. The CAT scan of the abdomen and pelvis with IV contrast performed on August 9, 2019 revealed colonic diverticulosis without evidence of diverticulitis and moderate distention of the bladder. The patient refused colonoscopy and was discharged in stable condition. The patient is still refusing colonoscopy to assess the rectal bleeding and history of anemia. The patient admits to having a prior upper endoscopy and colonoscopy performed by another gastroenterologist, Dr. Jose. According to the patient’s daughter, the upper endoscopic findings revealed a gastric polyp. The colonoscopic findings performed performed by Dr. RAHEEM Jose on April 20, 2015 revealed colonic polyps. diverticulosis and internal hemorrhoids. 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 The blood work performed by her PMD on June 16, 2020 revealed an elevated total cholesterol of 200 mg/dL, a low sodium/chloride level of 129/91 mmol/L, respectively, and elevated ferritin level of 7 64 ng/mL. The COVID 19 IgG antibody was negative. The patient denies any significant family history of GI problems.  [de-identified] : (-) smoking, (-) ETOH, (-) IVDA\par

## 2022-04-04 NOTE — ASSESSMENT
[FreeTextEntry1] : Dyspepsia: The patient complains of dyspeptic symptoms.  The patient was advised to abide by an anti-gas (low FOD-MAP) diet.  The patient was given a pamphlet for anti-gas (low FOD-MAP).  The patient and I reviewed the anti-gas (low FOD-MAP) diet at length. The patient is to start on a trial of Simethicone one tablet 4 times a day p.r.n. abdominal pain and gas.\par Constipation: The patient complains of constipation. I recommend a high-fiber diet. I recommend a trial of a probiotic such as Align once a day. I recommend a trial of Metamucil once a day for fiber supplementation.  The patient agreed and will followup to reassess the symptoms.  \par Rectal Bleeding: The patient had episodes of rectal bleeding.  The etiology of the rectal bleeding is unclear.  I recommend a trial of Anusol HC suppositories one per rectum QHS and Anusol HC 2.5% cream apply to affected area twice a day PRN hemorrhoidal bleeding or pain.  I recommend a trial of Calmoseptine cream apply to affected area twice a day for rectal discomfort. I recommend Tucks pads for the hemorrhoids.  I recommend starting Sitz baths twice a day for the hemorrhoids.  I recommend avoid wearing tight underwear and use boxers. I recommend avoid touching the perineal area. \par Prior Endoscopic Procedures: The patient had a prior upper endoscopy and colonoscopy performed by another gastroenterologist. I will try to obtain the prior upper endoscopy and colonoscopy reports. The patient is to sign a record release to obtain those records. \par History of Colonic Polyps: The patient has a history of colonic polyps. I recommend a repeat colonoscopy to reassess for colonic polyps. The patient is hesitant about undergoing the procedure. \par Diverticulosis: I recommend a high-fiber diet and avoid seeds. The patient is to consider a trial of Metamucil once a day for fiber supplementation. The patient is to also consider a trial of a probiotic such as Align once a day. \par Internal Hemorrhoids: The patient is to consider a trial of Anusol H. C. suppositories one per rectum nightly and Anusol HC2.5% cream apply to affected area twice a day p.r.n. hemorrhoidal bleeding or pain. \par The patient was recently hospitalized at [facility] for GI bleeding. I reviewed the records.\par Diverticulosis: I recommend a high-fiber diet and avoid seeds. The patient is to continue on a trial of Metamucil once a day for fiber supplementation. The patient is to also continue on a trial of a probiotic such as Align once a day. \par Colonoscopy: I recommended a colonoscopy to assess the site of bleeding. The patient and family were again told of the risks and benefits of the procedure. The patient and family were again told of the risks of perforation, emergency surgery, bleeding, infections and missed lesions. The patient and family are still hesitant about undergoing the procedure and will not schedule for the procedure. The patient is to return to the office after discussing the decision for a colonoscopy but still refuses the procedure.\par Anemia: The patient was previously found to have anemia on prior blood work. The patient denies any bright red blood per rectum, melena or hematemesis. The patient was previously guaiac-negative on physical exam. The recent blood work did not reveal anemia. I recommended an upper endoscopy and colonoscopy to assess the anemia. The patient and family were again told of the risks and benefits of the procedure. The patient and family were again told of the risks of perforation, emergency surgery, bleeding, infections and missed lesions. The patient and family are still refusing to schedule for the procedures at this time secondary to the patient's frail state and advanced age. The patient and family are again aware of the risks of further bleeding, peptic ulcer disease, colitis, and even cancer as a cause of the anemia but still refuse to undergo the procedures. \par Follow-up: The patient is to follow-up in the office in 6 months to reassess the symptoms. The patient was told to call the office if any further problems. \par \par \par \par

## 2022-04-08 RX ORDER — SUCRALFATE 1 G/10ML
1 SUSPENSION ORAL
Qty: 1200 | Refills: 11 | Status: ACTIVE | COMMUNITY
Start: 2022-04-08 | End: 1900-01-01

## 2022-04-12 ENCOUNTER — NON-APPOINTMENT (OUTPATIENT)
Age: 87
End: 2022-04-12

## 2022-04-12 LAB — HEMOCCULT STL QL IA: NEGATIVE

## 2023-05-17 ENCOUNTER — NON-APPOINTMENT (OUTPATIENT)
Age: 88
End: 2023-05-17

## 2023-05-17 ENCOUNTER — APPOINTMENT (OUTPATIENT)
Dept: GASTROENTEROLOGY | Facility: CLINIC | Age: 88
End: 2023-05-17
Payer: MEDICARE

## 2023-05-17 VITALS
TEMPERATURE: 98.1 F | DIASTOLIC BLOOD PRESSURE: 77 MMHG | SYSTOLIC BLOOD PRESSURE: 181 MMHG | BODY MASS INDEX: 15.72 KG/M2 | WEIGHT: 78 LBS | OXYGEN SATURATION: 99 % | HEIGHT: 59 IN | HEART RATE: 65 BPM

## 2023-05-17 DIAGNOSIS — L29.0 PRURITUS ANI: ICD-10-CM

## 2023-05-17 LAB — HEMOCCULT STL QL: NEGATIVE

## 2023-05-17 PROCEDURE — 99214 OFFICE O/P EST MOD 30 MIN: CPT

## 2023-05-17 RX ORDER — HYDROCORTISONE 25 MG/G
2.5 CREAM TOPICAL
Qty: 2 | Refills: 3 | Status: ACTIVE | COMMUNITY
Start: 2023-05-17 | End: 1900-01-01

## 2023-05-17 RX ORDER — BIFIDOBACTERIUM LONGUM 10MM CELL
4 CAPSULE ORAL
Qty: 30 | Refills: 3 | Status: ACTIVE | COMMUNITY
Start: 2023-05-17 | End: 1900-01-01

## 2023-05-17 NOTE — ASSESSMENT
[FreeTextEntry1] : Dyspepsia: The patient complains of dyspeptic symptoms.  The patient was advised to continue to abide by an anti-gas (low FOD-MAP) diet.  The patient was previously given a pamphlet for anti-gas (low FOD-MAP).  The patient and I reviewed the anti-gas (low FOD-MAP)diet at length again. The patient is to continue on a trial of Simethicone one tablet 4 times a day p.r.n. abdominal pain and gas.\par Constipation: The patient complains of constipation. I recommend a high-fiber diet. I recommend a trial of a probiotic such as Align once a day. I recommend a trial of Metamucil once a day for fiber supplementation. I recommend a trial of Miralax 1 packet once a day PRN for the constipation. If the symptoms persist, the patient may require a colonoscopy to assess the symptoms.  The patient was told of the risks and benefits of the procedure.  The patient was told of the risks of perforation, emergency surgery, bleeding, infections and missed lesions.  The patient agreed and will followup to reassess the symptoms.  \par Prior Endoscopic Procedures: The patient had a prior upper endoscopy and colonoscopy performed by another gastroenterologist. I will try to obtain the prior upper endoscopy and colonoscopy reports. The patient is to sign a record release to obtain those records. \par History of Colonic Polyps: The patient has a history of colonic polyps. I recommend a repeat colonoscopy to reassess for colonic polyps. The patient is hesitant about undergoing the procedure. \par Diverticulosis: I recommend a high-fiber diet and avoid seeds. The patient is to consider a trial of Metamucil once a day for fiber supplementation. The patient is to also consider a trial of a probiotic such as Align once a day. \par Internal Hemorrhoids: The patient is to consider a trial of Anusol H. C. suppositories one per rectum nightly and Anusol HC2.5% cream apply to affected area twice a day p.r.n. hemorrhoidal bleeding or pain. \par The patient was previously hospitalized at [facility] for GI bleeding. I reviewed the records.\par Diverticulosis: I recommend a high-fiber diet and avoid seeds. The patient is to continue on a trial of Metamucil once a day for fiber supplementation. The patient is to also continue on a trial of a probiotic such as Align once a day. \par Colonoscopy: I recommended a colonoscopy to assess the site of bleeding. The patient and family were again told of the risks and benefits of the procedure. The patient and family were again told of the risks of perforation, emergency surgery, bleeding, infections and missed lesions. The patient and family are still hesitant about undergoing the procedure and will not schedule for the procedure. The patient is to return to the office after discussing the decision for a colonoscopy but still refuses the procedure.\par Anemia: The patient was previously found to have anemia on prior blood work. The patient denies any bright red blood per rectum, melena or hematemesis. The stool guaiac performed on April 11, 2022 revealed was negative for occult blood. The recent blood work did not reveal anemia. I recommended an upper endoscopy and colonoscopy to assess the anemia. The patient and family were again told of the risks and benefits of the procedure. The patient and family were again told of the risks of perforation, emergency surgery, bleeding, infections and missed lesions. The patient and family are still refusing to schedule for the procedures at this time secondary to the patient's frail state and advanced age. The patient and family are again aware of the risks of further bleeding, peptic ulcer disease, colitis, and even cancer as a cause of the anemia but still refuse to undergo the procedures. \par I recommend a stool guaiac to assess for occult blood in the stool.\par Follow-up: The patient is to follow-up in the office in 6 months to reassess the symptoms. The patient was told to call the office if any further problems. \par \par

## 2023-05-17 NOTE — HISTORY OF PRESENT ILLNESS
[FreeTextEntry1] : The colonoscopic findings performed performed by Dr. RAHEEM Jose on April 20, 2015 revealed colonic polyps. diverticulosis and internal hemorrhoids. The pathology revealed hyperplastic polyps and tubular adenoma.\par  [de-identified] : The CAT scan of the abdomen and pelvis with IV contrast performed on August 9, 2019 revealed colonic diverticulosis without evidence of diverticulitis and moderate distention of the bladder. \par \par

## 2023-11-15 ENCOUNTER — APPOINTMENT (OUTPATIENT)
Dept: GASTROENTEROLOGY | Facility: CLINIC | Age: 88
End: 2023-11-15
Payer: MEDICARE

## 2023-11-15 VITALS
SYSTOLIC BLOOD PRESSURE: 128 MMHG | WEIGHT: 79.5 LBS | BODY MASS INDEX: 16.03 KG/M2 | DIASTOLIC BLOOD PRESSURE: 81 MMHG | OXYGEN SATURATION: 99 % | HEIGHT: 59 IN | HEART RATE: 67 BPM | TEMPERATURE: 98 F

## 2023-11-15 DIAGNOSIS — K59.00 CONSTIPATION, UNSPECIFIED: ICD-10-CM

## 2023-11-15 DIAGNOSIS — D64.9 ANEMIA, UNSPECIFIED: ICD-10-CM

## 2023-11-15 DIAGNOSIS — R10.13 EPIGASTRIC PAIN: ICD-10-CM

## 2023-11-15 DIAGNOSIS — R10.32 RIGHT LOWER QUADRANT PAIN: ICD-10-CM

## 2023-11-15 DIAGNOSIS — R10.31 RIGHT LOWER QUADRANT PAIN: ICD-10-CM

## 2023-11-15 DIAGNOSIS — Z86.010 PERSONAL HISTORY OF COLONIC POLYPS: ICD-10-CM

## 2023-11-15 PROCEDURE — 99214 OFFICE O/P EST MOD 30 MIN: CPT

## 2023-11-15 RX ORDER — FAMOTIDINE 20 MG/1
20 TABLET, FILM COATED ORAL
Qty: 60 | Refills: 0 | Status: ACTIVE | COMMUNITY
Start: 2019-10-18 | End: 1900-01-01

## 2023-11-15 RX ORDER — POLYETHYLENE GLYCOL 3350 17 G/17G
17 POWDER, FOR SOLUTION ORAL DAILY
Qty: 30 | Refills: 3 | Status: ACTIVE | COMMUNITY
Start: 2021-05-03 | End: 1900-01-01

## 2023-11-15 RX ORDER — SIMETHICONE 125 MG/1
125 TABLET, CHEWABLE ORAL
Qty: 120 | Refills: 3 | Status: ACTIVE | COMMUNITY
Start: 2023-05-17 | End: 1900-01-01

## 2024-11-15 ENCOUNTER — APPOINTMENT (OUTPATIENT)
Dept: GASTROENTEROLOGY | Facility: CLINIC | Age: 89
End: 2024-11-15
Payer: MEDICARE

## 2024-11-15 ENCOUNTER — NON-APPOINTMENT (OUTPATIENT)
Age: 89
End: 2024-11-15

## 2024-11-15 VITALS
SYSTOLIC BLOOD PRESSURE: 158 MMHG | DIASTOLIC BLOOD PRESSURE: 65 MMHG | HEIGHT: 59 IN | WEIGHT: 82 LBS | BODY MASS INDEX: 16.53 KG/M2 | OXYGEN SATURATION: 98 % | HEART RATE: 69 BPM | TEMPERATURE: 97.2 F

## 2024-11-15 DIAGNOSIS — D64.9 ANEMIA, UNSPECIFIED: ICD-10-CM

## 2024-11-15 DIAGNOSIS — Z86.0100 PERSONAL HISTORY OF COLON POLYPS, UNSPECIFIED: ICD-10-CM

## 2024-11-15 PROCEDURE — 99214 OFFICE O/P EST MOD 30 MIN: CPT

## 2024-11-20 ENCOUNTER — NON-APPOINTMENT (OUTPATIENT)
Age: 89
End: 2024-11-20

## 2024-11-20 LAB — HEMOCCULT STL QL IA: NEGATIVE

## 2025-05-16 ENCOUNTER — APPOINTMENT (OUTPATIENT)
Dept: GASTROENTEROLOGY | Facility: CLINIC | Age: 89
End: 2025-05-16